# Patient Record
Sex: MALE | Race: WHITE | URBAN - METROPOLITAN AREA
[De-identification: names, ages, dates, MRNs, and addresses within clinical notes are randomized per-mention and may not be internally consistent; named-entity substitution may affect disease eponyms.]

---

## 2017-01-05 ENCOUNTER — CARE COORDINATION (OUTPATIENT)
Dept: UROLOGY | Facility: CLINIC | Age: 67
End: 2017-01-05

## 2017-01-05 NOTE — PROGRESS NOTES
Left message for patient to return my call. I want to make sure that he has started his antibiotic for next weeks procedure.

## 2017-01-06 ENCOUNTER — CARE COORDINATION (OUTPATIENT)
Dept: UROLOGY | Facility: CLINIC | Age: 67
End: 2017-01-06

## 2017-01-06 NOTE — PROGRESS NOTES
Left message for patient to return my call. I just want to make sure that he is taking his presurgery antibiotic.Annabelle Dong RN

## 2017-01-12 ENCOUNTER — ANESTHESIA (OUTPATIENT)
Dept: SURGERY | Facility: CLINIC | Age: 67
End: 2017-01-12
Payer: MEDICARE

## 2017-01-12 ENCOUNTER — SURGERY (OUTPATIENT)
Age: 67
End: 2017-01-12

## 2017-01-12 PROBLEM — N40.0 BPH (BENIGN PROSTATIC HYPERPLASIA): Status: ACTIVE | Noted: 2017-01-12

## 2017-01-12 PROCEDURE — 25800025 ZZH RX 258: Performed by: NURSE ANESTHETIST, CERTIFIED REGISTERED

## 2017-01-12 PROCEDURE — 25000128 H RX IP 250 OP 636: Performed by: UROLOGY

## 2017-01-12 PROCEDURE — 25000125 ZZHC RX 250: Performed by: NURSE ANESTHETIST, CERTIFIED REGISTERED

## 2017-01-12 PROCEDURE — 25000125 ZZHC RX 250: Performed by: UROLOGY

## 2017-01-12 RX ORDER — PROPOFOL 10 MG/ML
INJECTION, EMULSION INTRAVENOUS PRN
Status: DISCONTINUED | OUTPATIENT
Start: 2017-01-12 | End: 2017-01-12

## 2017-01-12 RX ORDER — FUROSEMIDE 10 MG/ML
INJECTION INTRAMUSCULAR; INTRAVENOUS PRN
Status: DISCONTINUED | OUTPATIENT
Start: 2017-01-12 | End: 2017-01-12

## 2017-01-12 RX ORDER — ONDANSETRON 2 MG/ML
INJECTION INTRAMUSCULAR; INTRAVENOUS PRN
Status: DISCONTINUED | OUTPATIENT
Start: 2017-01-12 | End: 2017-01-12

## 2017-01-12 RX ORDER — GLYCOPYRROLATE 0.2 MG/ML
INJECTION, SOLUTION INTRAMUSCULAR; INTRAVENOUS PRN
Status: DISCONTINUED | OUTPATIENT
Start: 2017-01-12 | End: 2017-01-12

## 2017-01-12 RX ORDER — NEOSTIGMINE METHYLSULFATE 1 MG/ML
VIAL (ML) INJECTION PRN
Status: DISCONTINUED | OUTPATIENT
Start: 2017-01-12 | End: 2017-01-12

## 2017-01-12 RX ORDER — FENTANYL CITRATE 50 UG/ML
INJECTION, SOLUTION INTRAMUSCULAR; INTRAVENOUS PRN
Status: DISCONTINUED | OUTPATIENT
Start: 2017-01-12 | End: 2017-01-12

## 2017-01-12 RX ORDER — EPHEDRINE SULFATE 50 MG/ML
INJECTION, SOLUTION INTRAMUSCULAR; INTRAVENOUS; SUBCUTANEOUS PRN
Status: DISCONTINUED | OUTPATIENT
Start: 2017-01-12 | End: 2017-01-12

## 2017-01-12 RX ORDER — SODIUM CHLORIDE, SODIUM LACTATE, POTASSIUM CHLORIDE, CALCIUM CHLORIDE 600; 310; 30; 20 MG/100ML; MG/100ML; MG/100ML; MG/100ML
INJECTION, SOLUTION INTRAVENOUS CONTINUOUS PRN
Status: DISCONTINUED | OUTPATIENT
Start: 2017-01-12 | End: 2017-01-12

## 2017-01-12 RX ORDER — LIDOCAINE HYDROCHLORIDE 20 MG/ML
INJECTION, SOLUTION INFILTRATION; PERINEURAL PRN
Status: DISCONTINUED | OUTPATIENT
Start: 2017-01-12 | End: 2017-01-12

## 2017-01-12 RX ADMIN — Medication 5 MG: at 15:49

## 2017-01-12 RX ADMIN — GLYCOPYRROLATE 0.2 MG: 0.2 INJECTION, SOLUTION INTRAMUSCULAR; INTRAVENOUS at 14:57

## 2017-01-12 RX ADMIN — GENTAMICIN SULFATE 240 MG: 40 INJECTION, SOLUTION INTRAMUSCULAR; INTRAVENOUS at 14:54

## 2017-01-12 RX ADMIN — Medication 5 MG: at 14:54

## 2017-01-12 RX ADMIN — ONDANSETRON 4 MG: 2 INJECTION INTRAMUSCULAR; INTRAVENOUS at 15:10

## 2017-01-12 RX ADMIN — PROPOFOL 150 MG: 10 INJECTION, EMULSION INTRAVENOUS at 14:49

## 2017-01-12 RX ADMIN — MIDAZOLAM HYDROCHLORIDE 2 MG: 1 INJECTION, SOLUTION INTRAMUSCULAR; INTRAVENOUS at 14:40

## 2017-01-12 RX ADMIN — FENTANYL CITRATE 100 MCG: 50 INJECTION, SOLUTION INTRAMUSCULAR; INTRAVENOUS at 14:49

## 2017-01-12 RX ADMIN — FUROSEMIDE 10 MG: 10 INJECTION, SOLUTION INTRAVENOUS at 15:58

## 2017-01-12 RX ADMIN — FUROSEMIDE 10 MG: 10 INJECTION, SOLUTION INTRAVENOUS at 15:59

## 2017-01-12 RX ADMIN — Medication 5 MG: at 14:57

## 2017-01-12 RX ADMIN — SODIUM CHLORIDE, POTASSIUM CHLORIDE, SODIUM LACTATE AND CALCIUM CHLORIDE: 600; 310; 30; 20 INJECTION, SOLUTION INTRAVENOUS at 14:40

## 2017-01-12 RX ADMIN — LIDOCAINE HYDROCHLORIDE 100 MG: 20 INJECTION, SOLUTION INFILTRATION; PERINEURAL at 14:49

## 2017-01-12 RX ADMIN — CEFAZOLIN SODIUM 2 G: 2 INJECTION, SOLUTION INTRAVENOUS at 14:55

## 2017-01-12 RX ADMIN — ROCURONIUM BROMIDE 30 MG: 10 INJECTION INTRAVENOUS at 14:49

## 2017-01-12 RX ADMIN — NEOSTIGMINE METHYLSULFATE 3 MG: 1 INJECTION INTRAMUSCULAR; INTRAVENOUS; SUBCUTANEOUS at 16:16

## 2017-01-12 RX ADMIN — GLYCOPYRROLATE 0.6 MG: 0.2 INJECTION, SOLUTION INTRAMUSCULAR; INTRAVENOUS at 16:16

## 2017-01-12 RX ADMIN — GLYCOPYRROLATE 0.2 MG: 0.2 INJECTION, SOLUTION INTRAMUSCULAR; INTRAVENOUS at 15:00

## 2017-02-09 ENCOUNTER — PRE VISIT (OUTPATIENT)
Dept: UROLOGY | Facility: CLINIC | Age: 67
End: 2017-02-09

## 2017-02-09 NOTE — TELEPHONE ENCOUNTER
Holmium laser enucleation of the prostate surgical follow up. Left message with patient to arrive to visit with a full bladder for flow/pvr or urine sample. Instructed patient to call clinic with questions

## 2017-02-14 ENCOUNTER — OFFICE VISIT (OUTPATIENT)
Dept: UROLOGY | Facility: CLINIC | Age: 67
End: 2017-02-14

## 2017-02-14 VITALS
HEIGHT: 64 IN | SYSTOLIC BLOOD PRESSURE: 113 MMHG | HEART RATE: 57 BPM | BODY MASS INDEX: 24.01 KG/M2 | WEIGHT: 140.6 LBS | DIASTOLIC BLOOD PRESSURE: 69 MMHG

## 2017-02-14 DIAGNOSIS — R97.20 ELEVATED PROSTATE SPECIFIC ANTIGEN (PSA): ICD-10-CM

## 2017-02-14 DIAGNOSIS — R97.20 ELEVATED PROSTATE SPECIFIC ANTIGEN (PSA): Primary | ICD-10-CM

## 2017-02-14 LAB — PSA SERPL-MCNC: 1.43 UG/L (ref 0–4)

## 2017-02-14 RX ORDER — TADALAFIL 5 MG
TABLET ORAL
Refills: 12 | COMMUNITY
Start: 2016-12-09

## 2017-02-14 ASSESSMENT — PAIN SCALES - GENERAL: PAINLEVEL: NO PAIN (0)

## 2017-02-14 NOTE — MR AVS SNAPSHOT
After Visit Summary   2/14/2017    Doni Niño    MRN: [id number]           Patient Information     Date Of Birth          1950        Visit Information        Provider Department      2/14/2017 10:00 AM Darnell Vidales MD [facility] Urology and [facility] for Prostate and Urologic Cancers        Today's Diagnoses     Elevated prostate specific antigen (PSA)    -  1      Care Instructions    PSA today.   Return in 2 months with a full bladder for a flow/pvr.    It was a pleasure meeting with you today.  Thank you for allowing me and my team the privilege of caring for you today.  YOU are the reason we are here, and I truly hope we provided you with the excellent service you deserve.  Please let us know if there is anything else we can do for you so that we can be sure you are leaving completely satisfied with your care experience.                Follow-ups after your visit        Your next 10 appointments already scheduled     May 09, 2017 10:00 AM CDT   (Arrive by 9:45 AM)   Return Visit with MD JOHNNY Puente [facility] Urology and [facility] for Prostate and Urologic Cancers ([facility])    [address] [place] [id number] [phone]              Who to contact     Please call your clinic at [phone] to:    Ask questions about your health    Make or cancel appointments    Discuss your medicines    Learn about your test results    Speak to your doctor   If you have compliments or concerns about an experience at your clinic, or if you wish to file a complaint, please contact [facility] Patient Relations at [phone] or email us at [email or web address]         Additional Information About Your Visit        MyChart Information     Vidit is an electronic gateway that provides easy, online access to your medical records. With Vidit, you can request a clinic appointment, read your test results, renew  "a prescription or communicate with your care team.     To sign up for MyChart visit the website at [email or web address]   You will be asked to enter the access code listed below, as well as some personal information. Please follow the directions to create your username and password.     Your access code is: [email or web address]  Expires: 3/20/2017 10:02 AM     Your access code will  in 90 days. If you need help or a new code, please contact your [facility] or call [phone] for assistance.        Care EveryWhere ID     This is your Care EveryWhere ID. This could be used by other organizations to access your [facility] medical records  [id number]        Your Vitals Were     Pulse Height BMI (Body Mass Index)             57 1.626 m (5' 4\") 24.13 kg/m2          Blood Pressure from Last 3 Encounters:   17 113/69   17 96/60   16 109/58    Weight from Last 3 Encounters:   17 63.8 kg (140 lb 9.6 oz)   17 62.5 kg (137 lb 12.6 oz)   16 64.5 kg (142 lb 1.6 oz)              We Performed the Following     COMPLEX UROFLOWMETRY     POST-VOID RESIDUAL BLADDER SCAN        Primary Care Provider Office Phone # Fax #    Darnell Persaud [phone]       [facility] [address] [address]        Thank you!     Thank you for choosing [facility] UROLOGY AND [facility] FOR PROSTATE AND UROLOGIC CANCERS  for your care. Our goal is always to provide you with excellent care. Hearing back from our patients is one way we can continue to improve our services. Please take a few minutes to complete the written survey that you may receive in the mail after your visit with us. Thank you!             Your Updated Medication List - Protect others around you: Learn how to safely use, store and throw away your medicines at www.disposemymeds.org.          This list is accurate as of: 17 11:59 PM.  Always use your most recent med list.                   " Brand Name Dispense Instructions for use    CIALIS 5 MG tablet   Generic drug:  tadalafil      Reported on 2/14/2017       ibuprofen 200 MG tablet    ADVIL/MOTRIN     Take 200 mg by mouth every 6 hours as needed for mild pain Reported on 2/14/2017       [facility]      Respironics REMSTAR 60 Series Auto CPAP 8-10cm H2O, mirage Fx nasal cushion, std       senna-docusate 8.6-50 MG per tablet    SENOKOT-S;PERICOLACE    30 tablet    Take 1-2 tablets by mouth 2 times daily as needed for constipation Take while on oral narcotics to prevent or treat constipation.       triamcinolone 0.1 % cream    KENALOG     Apply topically as needed Reported on 2/14/2017       VITAMIN D PO      Take 1 tablet by mouth daily Take one tablet daily

## 2017-02-14 NOTE — LETTER
2/14/2017       RE: Doni Niño  [address] [address] [address]     Dear Colleague,    Thank you for referring your patient, Doni Niño, to the [facility] UROLOGY AND INST FOR PROSTATE AND UROLOGIC CANCERS at [facility]. Please see a copy of my visit note below.    HoLEP 4 Week Follow-Up Note    Today I had the pleasure of seeing Mr. Niño in follow-up for a history of large gland BPH    He underwent holmium laser enucleation of the prostate approximately 4 weeks ago     48 gms of benign tissue were removed.  Pathology was benign    His recovery thus far has been uncomplicated.  He is a very active person and would like to resume biking, running, and weight lifting.    Today he notes that his stream is strong.  He does have some urinary leakage but it is steadily improving.  He is no longer using depends and is now down to underwear liners.  He is using 3 per day.       02/14/17  1043    Urinary Flow Rate   Peak Flow 26.4 mL/s     Average Flow 12 mL/s  by Pauline Bledsoe CMA  at 02/14/17 1043    Voided (mL) 128 mL  by Pauline Bledsoe CMA  at 02/14/17 1043    Residual Volume by Ultrasound 65 mL  by Pauline Bledsoe CMA  at 02/14/17 1043    Character of Curve Bell Shape     PSA   Date Value Ref Range Status   02/14/2017 1.43 0 - 4 ug/L Final     Comment:     Assay Method:  Chemiluminescence using Siemens Vista analyzer     Assessment/Plan - 66  year old male 4 weeks status post HoLEP  -Readressed post-op expectations after HoLEP.  Patient is having expected recovery, anticipate urinary leakage to continue to improve  -Will iniitate Roxanne, provided handout  -PSA is normal, down from 7-9 range preop  -May resume activities  -Return for symptom check in 2 months    Greater than 15 minutes spent with patient over half spent counseling and reviewing pathology, Kegels exercises, and post-op expectations for recovery.        Again, thank you for allowing me to  participate in the care of your patient.      Sincerely,    Darnell Vidales MD

## 2017-02-14 NOTE — PATIENT INSTRUCTIONS
PSA today.   Return in 2 months with a full bladder for a flow/pvr.    It was a pleasure meeting with you today.  Thank you for allowing me and my team the privilege of caring for you today.  YOU are the reason we are here, and I truly hope we provided you with the excellent service you deserve.  Please let us know if there is anything else we can do for you so that we can be sure you are leaving completely satisfied with your care experience.

## 2017-02-15 NOTE — PROGRESS NOTES
HoLEP 4 Week Follow-Up Note    Today I had the pleasure of seeing Mr. Niño in follow-up for a history of large gland BPH    He underwent holmium laser enucleation of the prostate approximately 4 weeks ago     48 gms of benign tissue were removed.  Pathology was benign    His recovery thus far has been uncomplicated.  He is a very active person and would like to resume biking, running, and weight lifting.    Today he notes that his stream is strong.  He does have some urinary leakage but it is steadily improving.  He is no longer using depends and is now down to underwear liners.  He is using 3 per day.       02/14/17  1043    Urinary Flow Rate   Peak Flow 26.4 mL/s     Average Flow 12 mL/s  by Pauline Bledsoe CMA  at 02/14/17 1043    Voided (mL) 128 mL  by Pauline Bledsoe CMA  at 02/14/17 1043    Residual Volume by Ultrasound 65 mL  by Pauline Bledsoe CMA  at 02/14/17 1043    Character of Curve Bell Shape     PSA   Date Value Ref Range Status   02/14/2017 1.43 0 - 4 ug/L Final     Comment:     Assay Method:  Chemiluminescence using Siemens Vista analyzer     Assessment/Plan - 66  year old male 4 weeks status post HoLEP  -Readressed post-op expectations after HoLEP.  Patient is having expected recovery, anticipate urinary leakage to continue to improve  -Will iniitaeliane Oliveira, lizette handout  -PSA is normal, down from 7-9 range preop  -May resume activities  -Return for symptom check in 2 months    Greater than 15 minutes spent with patient over half spent counseling and reviewing pathology, Kegels exercises, and post-op expectations for recovery.

## 2017-05-02 ENCOUNTER — PRE VISIT (OUTPATIENT)
Dept: UROLOGY | Facility: CLINIC | Age: 67
End: 2017-05-02

## 2017-05-02 NOTE — TELEPHONE ENCOUNTER
Patient coming in for surgical follow up. Patient will need flow/ru. Message left for patient to arrive with full bladder and to call clinic with any questions.

## 2017-05-09 ENCOUNTER — OFFICE VISIT (OUTPATIENT)
Dept: UROLOGY | Facility: CLINIC | Age: 67
End: 2017-05-09

## 2017-05-09 VITALS
DIASTOLIC BLOOD PRESSURE: 64 MMHG | HEART RATE: 50 BPM | BODY MASS INDEX: 23.9 KG/M2 | WEIGHT: 140 LBS | SYSTOLIC BLOOD PRESSURE: 113 MMHG | HEIGHT: 64 IN

## 2017-05-09 DIAGNOSIS — R35.0 URINARY FREQUENCY: Primary | ICD-10-CM

## 2017-05-09 ASSESSMENT — PAIN SCALES - GENERAL: PAINLEVEL: NO PAIN (0)

## 2017-05-09 NOTE — NURSING NOTE
Chief Complaint   Patient presents with     RECHECK     PVR     PVR was obtained via ultrasound    Christopher FISHER

## 2017-05-09 NOTE — LETTER
2017     RE: Doni Niño  [address] [address] [address]     Dear Colleague,    Thank you for referring your patient, Doni Niño, to the [facility] UROLOGY AND INST FOR PROSTATE AND UROLOGIC CANCERS at [facility]. Please see a copy of my visit note below.    HoLEP 12 Week Follow-Up Note    Today I had the pleasure of seeing Mr. Niño in follow-up for a history of BPH     He underwent holmium laser enucleation of the prostate approximately 12 weeks ago     48 gms of tissue were removed.  Pathology was benign    His recovery thus far has been uncomplicated.    Today he notes an excellent stream.  He empties rapidly and to completion.  He does still have a slight amount of urinary leakage for which he uses 1 underwear shield per day.  This is a significant improvement from last visit.  He has resumed all normal activities and denies hematuria, dysuria or UTI.    AUA Symptom Score is 16/35 with a 3/6 for bother (23/2 preop)    Uroflow/Post-Void Residual by Bladder Scan  Voided Volume - 260  qMax - 39.5 mL/sec  qAvg - 19.7  PVR - 13  Shape of curve - bell    Preop UF/BS  Voided Volume: 199  QMax: 5.2  QAv.9  PVR: 62  Curve - flat from start to finish    PSA   Date Value Ref Range Status   2017 1.43 0 - 4 ug/L Final     Comment:     Assay Method:  Chemiluminescence using Siemens Vista analyzer       Assessment/Plan - 66 year old male 12 weeks status post HoLEP  -Overall doing well aside from slight urinary leakage which is improving   -Encouraged Simran, will also send for evaluation with pelvic floor PT to make sure doing exercises correctly  -RTC 1 year with PSA or sooner PRN      >15 minutes were spent face to face with patient over half of which was spent providing medical counseling regarding post-HoLEP urinary symptoms, pelvic floor strengthening    Again, thank you for allowing me to participate in the care of your patient.      Sincerely,    Darnell CURTIS  MD Flash

## 2017-05-09 NOTE — PATIENT INSTRUCTIONS
Follow up in 1 year with Dr Vidales. Please arrive to visit with a full bladder. You'll be notify to schedule closer to visit    It was a pleasure meeting with you today.  Thank you for allowing me and my team the privilege of caring for you today.  YOU are the reason we are here, and I truly hope we provided you with the excellent service you deserve.  Please let us know if there is anything else we can do for you so that we can be sure you are leaving completely satisfied with your care experience.

## 2017-05-09 NOTE — MR AVS SNAPSHOT
After Visit Summary   5/9/2017    Doni Niño    MRN: [id number]           Patient Information     Date Of Birth          1950        Visit Information        Provider Department      5/9/2017 10:00 AM Darnell Vidales MD [facility]rology and [facility] for Prostate and Urologic Cancers        Today's Diagnoses     Urinary frequency    -  1      Care Instructions    Follow up in 1 year with Dr Vidales. Please arrive to visit with a full bladder. You'll be notify to schedule closer to visit    It was a pleasure meeting with you today.  Thank you for allowing me and my team the privilege of caring for you today.  YOU are the reason we are here, and I truly hope we provided you with the excellent service you deserve.  Please let us know if there is anything else we can do for you so that we can be sure you are leaving completely satisfied with your care experience.                Follow-ups after your visit        Additional Services     [facility] Physical Therapy Referral       **This order will print in the [facility] Scheduling Office**    Physical Therapy, Hand Therapy and Chiropractic Care are available through:    *[facility]  *[facility]  *[facility]    Call one number to schedule at any of the above locations: [phone].    Your provider has referred you to: Physical Therapy at [facility] or [facility]    Indication/Reason for Referral: Men's Health (Please Complete Special Programs SmartList)  Onset of Illness: 2/2017  Therapy Orders: Evaluate and Treat  Special Programs: Men's Health: Pelvic Floor Weakness/Incontinence - Specific Diagnosis: Urinary leakage  Special Request: Exercise: Conditioning, Home Exercise Program and Simran Hay      Additional Comments for the Therapist or Chiropractor:     Please be aware that coverage of these services is subject to the terms and limitations of your health insurance plan.  Call member services at  "your health plan with any benefit or coverage questions.      Please bring the following to your appointment:    *Your personal calendar for scheduling future appointments  *Comfortable clothing                  Who to contact     Please call your clinic at [phone] to:    Ask questions about your health    Make or cancel appointments    Discuss your medicines    Learn about your test results    Speak to your doctor   If you have compliments or concerns about an experience at your clinic, or if you wish to file a complaint, please contact [facility] Patient Relations at [phone] or email us at [email or web address]         Additional Information About Your Visit        [employer] Information     [employer] is an electronic gateway that provides easy, online access to your medical records. With [employer], you can request a clinic appointment, read your test results, renew a prescription or communicate with your care team.     To sign up for [employer] visit the website at [email or web address]   You will be asked to enter the access code listed below, as well as some personal information. Please follow the directions to create your username and password.     Your access code is: [email or web address]  Expires: 2017  6:30 AM     Your access code will  in 90 days. If you need help or a new code, please contact your [facility] or call [phone] for assistance.        Care EveryWhere ID     This is your Care EveryWhere ID. This could be used by other organizations to access your [facility] medical records  [id number]        Your Vitals Were     Pulse Height BMI (Body Mass Index)             50 1.626 m (5' 4\") 24.03 kg/m2          Blood Pressure from Last 3 Encounters:   17 113/64   17 113/69   17 96/60    Weight from Last 3 Encounters:   17 63.5 kg (140 lb)   17 63.8 kg (140 lb 9.6 oz)   17 62.5 kg (137 lb 12.6 " oz)              We Performed the Following     [facility] Physical Therapy Referral     POST-VOID RESIDUAL BLADDER SCAN        Primary Care Provider Office Phone # Fax #    Darnell Persaud [phone]       [facility] [address] [place] [address]        Thank you!     Thank you for choosing [facility] UROLOGY AND [facility] FOR PROSTATE AND UROLOGIC CANCERS  for your care. Our goal is always to provide you with excellent care. Hearing back from our patients is one way we can continue to improve our services. Please take a few minutes to complete the written survey that you may receive in the mail after your visit with us. Thank you!             Your Updated Medication List - Protect others around you: Learn how to safely use, store and throw away your medicines at www.disposemymeds.org.          This list is accurate as of: 5/9/17 11:59 PM.  Always use your most recent med list.                   Brand Name Dispense Instructions for use    CIALIS 5 MG tablet   Generic drug:  tadalafil      Reported on 2/14/2017       ibuprofen 200 MG tablet    ADVIL/MOTRIN     Take 200 mg by mouth every 6 hours as needed for mild pain Reported on 2/14/2017       order for [facility]      Respironics REMSTAR 60 Series Auto CPAP 8-10cm H2O, mirage Fx nasal cushion, std       senna-docusate 8.6-50 MG per tablet    SENOKOT-S;PERICOLACE    30 tablet    Take 1-2 tablets by mouth 2 times daily as needed for constipation Take while on oral narcotics to prevent or treat constipation.       triamcinolone 0.1 % cream    KENALOG     Apply topically as needed Reported on 2/14/2017       VITAMIN D PO      Take 1 tablet by mouth daily Take one tablet daily

## 2017-05-09 NOTE — PROGRESS NOTES
HoLEP 12 Week Follow-Up Note    Today I had the pleasure of seeing Mr. Niño in follow-up for a history of BPH     He underwent holmium laser enucleation of the prostate approximately 12 weeks ago     48 gms of tissue were removed.  Pathology was benign    His recovery thus far has been uncomplicated.    Today he notes an excellent stream.  He empties rapidly and to completion.  He does still have a slight amount of urinary leakage for which he uses 1 underwear shield per day.  This is a significant improvement from last visit.  He has resumed all normal activities and denies hematuria, dysuria or UTI.    AUA Symptom Score is 16/35 with a 3/6 for bother (23/2 preop)    Uroflow/Post-Void Residual by Bladder Scan  Voided Volume - 260  qMax - 39.5 mL/sec  qAvg - 19.7  PVR - 13  Shape of curve - bell    Preop UF/BS  Voided Volume: 199  QMax: 5.2  QAv.9  PVR: 62  Curve - flat from start to finish    PSA   Date Value Ref Range Status   2017 1.43 0 - 4 ug/L Final     Comment:     Assay Method:  Chemiluminescence using Siemens Vista analyzer       Assessment/Plan - 66 year old male 12 weeks status post HoLEP  -Overall doing well aside from slight urinary leakage which is improving   -Encouraged Simran, will also send for evaluation with pelvic floor PT to make sure doing exercises correctly  -RTC 1 year with PSA or sooner PRN      >15 minutes were spent face to face with patient over half of which was spent providing medical counseling regarding post-HoLEP urinary symptoms, pelvic floor strengthening

## 2017-05-18 ENCOUNTER — THERAPY VISIT (OUTPATIENT)
Dept: PHYSICAL THERAPY | Facility: CLINIC | Age: 67
End: 2017-05-18
Payer: COMMERCIAL

## 2017-05-18 DIAGNOSIS — M99.05 PELVIC SOMATIC DYSFUNCTION: Primary | ICD-10-CM

## 2017-05-18 DIAGNOSIS — N39.46 MIXED INCONTINENCE URGE AND STRESS (MALE)(FEMALE): ICD-10-CM

## 2017-05-18 DIAGNOSIS — R27.9 LACK OF COORDINATION: ICD-10-CM

## 2017-05-18 PROCEDURE — G8979 MOBILITY GOAL STATUS: HCPCS | Mod: GP | Performed by: PHYSICAL THERAPIST

## 2017-05-18 PROCEDURE — 97110 THERAPEUTIC EXERCISES: CPT | Mod: GP | Performed by: PHYSICAL THERAPIST

## 2017-05-18 PROCEDURE — G8978 MOBILITY CURRENT STATUS: HCPCS | Mod: GP | Performed by: PHYSICAL THERAPIST

## 2017-05-18 PROCEDURE — 97530 THERAPEUTIC ACTIVITIES: CPT | Mod: GP | Performed by: PHYSICAL THERAPIST

## 2017-05-18 PROCEDURE — 97161 PT EVAL LOW COMPLEX 20 MIN: CPT | Mod: GP | Performed by: PHYSICAL THERAPIST

## 2017-05-18 NOTE — Clinical Note
Please see the evaluation report completed in PT today.  Thank you for referring Ajit to us! Cordially,  ROBERT Jameson, MPT

## 2017-05-18 NOTE — MR AVS SNAPSHOT
After Visit Summary   5/18/2017    Doni Niño    MRN: [id number]           Patient Information     Date Of Birth          1950        Visit Information        Provider Department      5/18/2017 4:00 PM Anne Jameson PT [facility]        Today's Diagnoses     Pelvic somatic dysfunction    -  1    Mixed incontinence urge and stress (male)(female)        Lack of coordination           Follow-ups after your visit        Your next 10 appointments already scheduled     May 25, 2017  5:20 PM CDT   [facility] with Anne Jameson [facility]   [facility] ([facility]  )    [address] [facility] [address]   [phone]            Jun 08, 2017  4:40 PM CDT   [facility] with Anne Jameson PT   [facility] ([facility]  )    [address] [facility] [id number] [phone]              Who to contact     If you have questions or need follow up information about today's clinic visit or your schedule please contact [facility] [employer] [facility] directly at [phone].  Normal or non-critical lab and imaging results will be communicated to you by MyChart, letter or phone within 4 business days after the clinic has received the results. If you do not hear from us within 7 days, please contact the clinic through [employer] or phone. If you have a critical or abnormal lab result, we will notify you by phone as soon as possible.  Submit refill requests through [employer] or call your pharmacy and they will forward the refill request to us. Please allow 3 business days for your refill to be completed.          Additional Information About Your Visit        [employer] Information     WellDoc lets you send messages to your doctor, view your test results, renew your prescriptions, schedule appointments and more. To sign up, go to  [email or web address] . Click on \"Log in\" on the left side of the screen, which will take you to the Welcome page. Then click on \"Sign up Now\" on the right side of the page.     You will be asked to enter the access code listed below, as well as some personal information. Please follow the directions to create your username and password.     Your access code is: [email or web address]  Expires: 2017  6:30 AM     Your access code will  in 90 days. If you need help or a new code, please call your [place] clinic or [phone].        Care EveryWhere ID     This is your Care EveryWhere ID. This could be used by other organizations to access your [place] medical records  [id number]         Blood Pressure from Last 3 Encounters:   17 113/64   17 113/69   17 96/60    Weight from Last 3 Encounters:   17 63.5 kg (140 lb)   17 63.8 kg (140 lb 9.6 oz)   17 62.5 kg (137 lb 12.6 oz)              We Performed the Following     HC PT EVAL, LOW COMPLEXITY     ANA CERT REPORT     THERAPEUTIC ACTIVITIES     THERAPEUTIC EXERCISES        Primary Care Provider Office Phone # Fax #    Darnell SHAHID Hung [phone]       [facility] [address] [facility] [address]        Thank you!     Thank you for choosing [facility]  for your care. Our goal is always to provide you with excellent care. Hearing back from our patients is one way we can continue to improve our services. Please take a few minutes to complete the written survey that you may receive in the mail after your visit with us. Thank you!             Your Updated Medication List - Protect others around you: Learn how to safely use, store and throw away your medicines at www.disposemymeds.org.          This list is accurate as of: 17  5:53 PM.  Always use your most recent med list.                   Brand Name Dispense Instructions for use    CIALIS 5 MG tablet   Generic drug:  " tadalafil      Reported on 2/14/2017       ibuprofen 200 MG tablet    ADVIL/MOTRIN     Take 200 mg by mouth every 6 hours as needed for mild pain Reported on 2/14/2017       [place] [facility]      Respironics REMSTAR 60 Series Auto CPAP 8-10cm H2O, mirage Fx nasal cushion, std       senna-docusate 8.6-50 MG per tablet    SENOKOT-S;PERICOLACE    30 tablet    Take 1-2 tablets by mouth 2 times daily as needed for constipation Take while on oral narcotics to prevent or treat constipation.       triamcinolone 0.1 % cream    KENALOG     Apply topically as needed Reported on 2/14/2017       VITAMIN D PO      Take 1 tablet by mouth daily Take one tablet daily

## 2017-05-18 NOTE — PROGRESS NOTES
[facility] Initial Evaluation      Subjective:    Patient is a 66 year old male presenting with rehab pelvic hpi. The history is provided by the patient.   Doni Niño is a 66 year old male with a pelvic dysfunction condition.  Condition occurred with:  After surgery.  Condition occurred: for unknown reasons.  This is a new condition  Pt had a new prostate surgery done w/laser (TURP?) in Jan 2017 to have 70% of the gland removed. He has since had some urinary incontinence and urinary frequency.  He has only needed to use one pad per day, and some days no pad at all.  He is voiding about every 30-60 minutes during the day, but is able to go all night w/o getting up to void.    He is only drinking about 16 oz of water per day, but he also drinks OJ, eats lots of fruits (heavy in fluids), iced tea, coffee.  He has BMs about every other day, eats prunes to help with his constipation. He has used stool softeners in the past.   He had his last urology recheck on 5/9/17 and was referred to PT at that time..    Patient reports pain:  N/a.      Pain Scale: N/A.   Pain is the same all the time and worse during the day.  Exacerbated by: urinary leaks w/odd movements, getting up too fast, lifting (usually small drops) and relieved by activity/movement (Kegels).  Since onset symptoms are gradually improving.        General health as reported by patient is excellent.  Pertinent medical history includes:  Osteoarthritis, incontinence and sleep disorder/apnea.  Medical allergies: none.  Other surgeries include:  Other (appendix, tonsils).  Current medications:  None as reported by the patient.  Current occupation is Retired .    Primary job tasks include:  Driving, lifting and repetitive tasks (pushing/pulling, lawn mowing, yard work).    Barriers include:  None as reported by the patient.    Red flags:  None as reported by the patient.                        Objective:    System                                  Pelvic Dysfunction Evaluation:    Bladder/Pelvic Problems:    Storage Problem:  Frequency and stress incontinence  Emptying Problem:  Postvoid dribbling            Pelvic Clock Exam:    Ischiocavernosis pain:  -  Bulbocavernosis pain:  -  Transverse Perineal:  -  Levator ANI:  -  Perineal Body:  -    Reflex Testing:  normal    External Assessment:  External assessment pelvic: extremely large scrotum.  Skin Condition:  Normal    Bearing Down/Coughing:  Normal  Tissue Symmetry:  Normal    Muscle Contraction/Perineal Mobility:  Substitution and elevation and urogential triangle descent  Internal Assessment:  NA              SEMG Biofeedback:    Equipment:  Blink Messenger-10 unit, Synergy program    Suraface electrode placement--Perianal:  Bilaterally placed  Baseline EMG PM:  3.5 uV    Peak pelvic muscle contraction:  18 uV w/both phasic and tonic contractions  Sustained contraction:  Poorly held, tended to hold breath, lots of substitutions  EMG interpretation to fatigue:  3-5 seconds  Position:  SupineAdditional History:        Caffeine Consumption:  12-18 oz per day                     General     ROS    Assessment/Plan:      Patient is a 66 year old male with pelvic complaints.    Patient has the following significant findings with corresponding treatment plan.                Diagnosis 1:  PF dysfunction s/p prostate procedure  Decreased strength - therapeutic exercise and therapeutic activities  Impaired muscle performance - biofeedback and neuro re-education  Decreased function - therapeutic activities    Therapy Evaluation Codes:   1) History comprised of:   Personal factors that impact the plan of care:      None.    Comorbidity factors that impact the plan of care are:      None.     Medications impacting care: None.  2) Examination of Body Systems comprised of:   Body structures and functions that impact the plan of care:      Pelvis.   Activity limitations that impact the plan of care are:      Lifting,  Frequency and Stress incontinence.  3) Clinical presentation characteristics are:   Stable/Uncomplicated.  4) Decision-Making    Low complexity using standardized patient assessment instrument and/or measureable assessment of functional outcome.  Cumulative Therapy Evaluation is: Low complexity.    Previous and current functional limitations:  (See Goal Flow Sheet for this information)    Short term and Long term goals: (See Goal Flow Sheet for this information)     Communication ability:  Patient appears to be able to clearly communicate and understand verbal and written communication and follow directions correctly.  Treatment Explanation - The following has been discussed with the patient:   RX ordered/plan of care  Anticipated outcomes  Possible risks and side effects  This patient would benefit from PT intervention to resume normal activities.   Rehab potential is excellent.    Frequency:  1 X week, once daily  Duration:  for 2 weeks tapering to 1 X a month over 1 month  Discharge Plan:  Achieve all LTG.  Independent in home treatment program.  Reach maximal therapeutic benefit.    Please refer to the daily flowsheet for treatment today, total treatment time and time spent performing 1:1 timed codes.

## 2017-05-25 ENCOUNTER — THERAPY VISIT (OUTPATIENT)
Dept: PHYSICAL THERAPY | Facility: CLINIC | Age: 67
End: 2017-05-25
Payer: COMMERCIAL

## 2017-05-25 DIAGNOSIS — R27.9 LACK OF COORDINATION: ICD-10-CM

## 2017-05-25 DIAGNOSIS — N39.46 MIXED INCONTINENCE URGE AND STRESS (MALE)(FEMALE): ICD-10-CM

## 2017-05-25 DIAGNOSIS — M99.05 PELVIC SOMATIC DYSFUNCTION: ICD-10-CM

## 2017-05-25 PROCEDURE — 97110 THERAPEUTIC EXERCISES: CPT | Mod: GP | Performed by: PHYSICAL THERAPIST

## 2017-05-25 PROCEDURE — 97112 NEUROMUSCULAR REEDUCATION: CPT | Mod: GP | Performed by: PHYSICAL THERAPIST

## 2017-06-08 ENCOUNTER — THERAPY VISIT (OUTPATIENT)
Dept: PHYSICAL THERAPY | Facility: CLINIC | Age: 67
End: 2017-06-08
Payer: COMMERCIAL

## 2017-06-08 DIAGNOSIS — R27.9 LACK OF COORDINATION: ICD-10-CM

## 2017-06-08 DIAGNOSIS — M99.05 PELVIC SOMATIC DYSFUNCTION: ICD-10-CM

## 2017-06-08 DIAGNOSIS — N39.46 MIXED INCONTINENCE URGE AND STRESS (MALE)(FEMALE): ICD-10-CM

## 2017-06-08 PROCEDURE — G8980 MOBILITY D/C STATUS: HCPCS | Mod: GP | Performed by: PHYSICAL THERAPIST

## 2017-06-08 PROCEDURE — 97110 THERAPEUTIC EXERCISES: CPT | Mod: GP | Performed by: PHYSICAL THERAPIST

## 2017-06-08 PROCEDURE — 97530 THERAPEUTIC ACTIVITIES: CPT | Mod: GP | Performed by: PHYSICAL THERAPIST

## 2017-06-08 PROCEDURE — 97112 NEUROMUSCULAR REEDUCATION: CPT | Mod: GP | Performed by: PHYSICAL THERAPIST

## 2017-06-08 PROCEDURE — G8979 MOBILITY GOAL STATUS: HCPCS | Mod: GP | Performed by: PHYSICAL THERAPIST

## 2017-06-08 NOTE — PROGRESS NOTES
Subjective:    HPI                    Objective:    System    Physical Exam    General     ROS    Assessment/Plan:      DISCHARGE REPORT    Progress reporting period is from 5/18/17 to 6/8/17.       SUBJECTIVE  Subjective changes noted by patient:   Pt notes that he mostly is able to make it through a day w/only a drop or 2 for leaks and hasn't needed to wear a pad for the last 2 wks; he is even lifting wts at the gym and no leaks. But pt had climbed a tree and no leaks w/the climbing.    Current pain level is  0/10.     Previous pain level was  0/10  .   Changes in function:  Yes (See Goal flowsheet attached for changes in current functional level)  Adverse reaction to treatment or activity: None    OBJECTIVE  Changes noted in objective findings:     PF strength is at functional levels, avg'ing 15 uV for both phasic and tonic contractions in sitting position. Pt was also able to perform a graded contraction (from rest to 50% to full, back to 50% and back to rest) w/o needing to watch the monitor.     ASSESSMENT/PLAN  Updated problem list and treatment plan: Diagnosis 1:  PF dysfunction w/urinary incontinence    STG/LTGs have been met or progress has been made towards goals:  Yes (See Goal flow sheet completed today.)  Assessment of Progress: The patient has met all of their long term goals.  Self Management Plans:  Patient has been instructed in a home treatment program.  Patient is independent in a home treatment program.  Patient  has been instructed in self management of symptoms.  Patient is independent in self management of symptoms.    Doni continues to require the following intervention to meet STG and LTG's:  PT intervention is no longer required to meet STG/LTG.    Recommendations:  This patient is ready to be discharged from therapy and continue their home treatment program.    Please refer to the daily flowsheet for treatment today, total treatment time and time spent performing 1:1 timed  codes.

## 2017-06-08 NOTE — MR AVS SNAPSHOT
"              After Visit Summary   2017    Doni Niño    MRN: [id number]           Patient Information     Date Of Birth          1950        Visit Information        Provider Department      2017 4:40 PM Anne Jameson PT [facility]        Today's Diagnoses     Pelvic somatic dysfunction        Lack of coordination        Mixed incontinence urge and stress (male)(female)           Follow-ups after your visit        Who to contact     If you have questions or need follow up information about today's clinic visit or your schedule please contact [facility] directly at [phone].  Normal or non-critical lab and imaging results will be communicated to you by Cogbook[employer]art, letter or phone within 4 business days after the clinic has received the results. If you do not hear from us within 7 days, please contact the clinic through [employer] or phone. If you have a critical or abnormal lab result, we will notify you by phone as soon as possible.  Submit refill requests through [employer] or call your pharmacy and they will forward the refill request to us. Please allow 3 business days for your refill to be completed.          Additional Information About Your Visit        MyChart Information     [employer] lets you send messages to your doctor, view your test results, renew your prescriptions, schedule appointments and more. To sign up, go to [email or web address] . Click on \"Log in\" on the left side of the screen, which will take you to the Welcome page. Then click on \"Sign up Now\" on the right side of the page.     You will be asked to enter the access code listed below, as well as some personal information. Please follow the directions to create your username and password.     Your access code is: [email or web address]  Expires: 2017  6:30 AM     Your access code will  in 90 days. If you need help or a new code, please call your [place] " clinic or [phone].        Care EveryWhere ID     This is your Care EveryWhere ID. This could be used by other organizations to access your [place] medical records  [id number]         Blood Pressure from Last 3 Encounters:   05/09/17 113/64   02/14/17 113/69   01/13/17 96/60    Weight from Last 3 Encounters:   05/09/17 63.5 kg (140 lb)   02/14/17 63.8 kg (140 lb 9.6 oz)   01/12/17 62.5 kg (137 lb 12.6 oz)              We Performed the Following     ANA PROGRESS NOTES REPORT     NEUROMUSCULAR RE-EDUCATION     THERAPEUTIC ACTIVITIES     THERAPEUTIC EXERCISES        Primary Care Provider Office Phone # Fax #    Darnell SHAHID Hung [phone]       [facility] [address]  [place] [address]        Thank you!     Thank you for choosing [facility]  for your care. Our goal is always to provide you with excellent care. Hearing back from our patients is one way we can continue to improve our services. Please take a few minutes to complete the written survey that you may receive in the mail after your visit with us. Thank you!             Your Updated Medication List - Protect others around you: Learn how to safely use, store and throw away your medicines at www.disposemymeds.org.          This list is accurate as of: 6/8/17  5:40 PM.  Always use your most recent med list.                   Brand Name Dispense Instructions for use    CIALIS 5 MG tablet   Generic drug:  tadalafil      Reported on 2/14/2017       ibuprofen 200 MG tablet    ADVIL/MOTRIN     Take 200 mg by mouth every 6 hours as needed for mild pain Reported on 2/14/2017       [place] [facility]      Respironics REMSTAR 60 Series Auto CPAP 8-10cm H2O, mirage Fx nasal cushion, std       senna-docusate 8.6-50 MG per tablet    SENOKOT-S;PERICOLACE    30 tablet    Take 1-2 tablets by mouth 2 times daily as needed for constipation Take while on oral narcotics to prevent or treat constipation.       triamcinolone  0.1 % cream    KENALOG     Apply topically as needed Reported on 2/14/2017       VITAMIN D PO      Take 1 tablet by mouth daily Take one tablet daily

## 2017-06-08 NOTE — Clinical Note
Please see the discharge summary report completed in PT today.  Thank you for referring Ajit to us! Cordially,  ROBERT Jameson, MPT

## 2017-07-18 ENCOUNTER — DOCUMENTATION ONLY (OUTPATIENT)
Dept: SLEEP MEDICINE | Facility: CLINIC | Age: 67
End: 2017-07-18

## 2017-07-18 DIAGNOSIS — G47.33 OSA (OBSTRUCTIVE SLEEP APNEA): Primary | Chronic | ICD-10-CM

## 2017-07-18 NOTE — PROGRESS NOTES
Patient came to [place] for mask fitting appointment on July 18, 2017. Patient requested to switch masks because he wants to try a full face mask.  Patient tried on the followings masks: Airfit F20   Patient selected  Resmed, type Airfit F2 0Full Face mask Medium.

## 2017-10-16 DIAGNOSIS — G47.33 OSA (OBSTRUCTIVE SLEEP APNEA): Primary | Chronic | ICD-10-CM

## 2017-10-16 PROBLEM — M54.50 CHRONIC LOW BACK PAIN: Status: ACTIVE | Noted: 2017-10-16

## 2017-10-16 PROBLEM — G89.29 CHRONIC LOW BACK PAIN: Status: ACTIVE | Noted: 2017-10-16

## 2017-10-16 NOTE — PROGRESS NOTES
LOV 9-8-16 Dr. Begum. Patient called requesting an order for a Travel size machine. [place]. Please put in order for machine,   Thanks.

## 2018-09-26 ENCOUNTER — OFFICE VISIT (OUTPATIENT)
Dept: SLEEP MEDICINE | Facility: CLINIC | Age: 68
End: 2018-09-26
Payer: COMMERCIAL

## 2018-09-26 VITALS
BODY MASS INDEX: 23.56 KG/M2 | WEIGHT: 138 LBS | HEIGHT: 64 IN | OXYGEN SATURATION: 99 % | DIASTOLIC BLOOD PRESSURE: 72 MMHG | SYSTOLIC BLOOD PRESSURE: 121 MMHG | HEART RATE: 58 BPM

## 2018-09-26 DIAGNOSIS — G47.33 OSA (OBSTRUCTIVE SLEEP APNEA): Primary | Chronic | ICD-10-CM

## 2018-09-26 PROBLEM — G89.29 CHRONIC LOW BACK PAIN: Chronic | Status: ACTIVE | Noted: 2017-10-16

## 2018-09-26 PROBLEM — M54.50 CHRONIC LOW BACK PAIN: Chronic | Status: ACTIVE | Noted: 2017-10-16

## 2018-09-26 PROCEDURE — 99214 OFFICE O/P EST MOD 30 MIN: CPT | Performed by: INTERNAL MEDICINE

## 2018-09-26 NOTE — MR AVS SNAPSHOT
After Visit Summary   9/26/2018    Doni Niño    MRN: [id number]           Patient Information     Date Of Birth          1950        Visit Information        Provider Department      9/26/2018 4:00 PM Davonte Begum MD [facility]        Today's Diagnoses     JULIO (obstructive sleep apnea)- moderate (AHI 28)    -  1      Care Instructions    Increased AHI on download to 17.8, with mostly obstructive events but also 4.9% PB  - Go back to using nasal pillows, recheck download in 30 days . If AI remains elevated would increased pressure to10-18 cmh20 and repeat download in 30 days  -Consider Polysomnogram with all-night titration if needed.        Your BMI is Body mass index is 23.69 kg/(m^2).  Weight management is a personal decision.  If you are interested in exploring weight loss strategies, the following discussion covers the approaches that may be successful. Body mass index (BMI) is one way to tell whether you are at a healthy weight, overweight, or obese. It measures your weight in relation to your height.  A BMI of 18.5 to 24.9 is in the healthy range. A person with a BMI of 25 to 29.9 is considered overweight, and someone with a BMI of 30 or greater is considered obese. More than two-thirds of American adults are considered overweight or obese.  Being overweight or obese increases the risk for further weight gain. Excess weight may lead to heart disease and diabetes.  Creating and following plans for healthy eating and physical activity may help you improve your health.  Weight control is part of healthy lifestyle and includes exercise, emotional health, and healthy eating habits. Careful eating habits lifelong are the mainstay of weight control. Though there are significant health benefits from weight loss, long-term weight loss with diet alone may be very difficult to achieve- studies show long-term success with dietary management in less than 10% of people. Attaining a  healthy weight may be especially difficult to achieve in those with severe obesity. In some cases, medications, devices and surgical management might be considered.  What can you do?  If you are overweight or obese and are interested in methods for weight loss, you should discuss this with your provider.     Consider reducing daily calorie intake by 500 calories.     Keep a food journal.     Avoiding skipping meals, consider cutting portions instead.    Diet combined with exercise helps maintain muscle while optimizing fat loss. Strength training is particularly important for building and maintaining muscle mass. Exercise helps reduce stress, increase energy, and improves fitness. Increasing exercise without diet control, however, may not burn enough calories to loose weight.       Start walking three days a week 10-20 minutes at a time    Work towards walking thirty minutes five days a week     Eventually, increase the speed of your walking for 1-2 minutes at time    In addition, we recommend that you review healthy lifestyles and methods for weight loss available through the National Institutes of Health patient information sites:  http://win.niddk.nih.gov/publications/index.htm    And look into health and wellness programs that may be available through your health insurance provider, employer, local community center, or [employer] club.                  Follow-ups after your visit        Follow-up notes from your care team     Return in about 2 years (around 9/26/2020), or if symptoms worsen or fail to improve, for Routine Visit.      Who to contact     If you have questions or need follow up information about today's clinic visit or your schedule please contact [facility] directly at [phone].  Normal or non-critical lab and imaging results will be communicated to you by MyChart, letter or phone within 4 business days after the clinic has received the results. If you do not hear from us within 7  "days, please contact the clinic through [employer] or phone. If you have a critical or abnormal lab result, we will notify you by phone as soon as possible.  Submit refill requests through [employer] or call your pharmacy and they will forward the refill request to us. Please allow 3 business days for your refill to be completed.          Additional Information About Your Visit        [employer] Information     [employer] lets you send messages to your doctor, view your test results, renew your prescriptions, schedule appointments and more. To sign up, go to [email or web address] . Click on \"Log in\" on the left side of the screen, which will take you to the Welcome page. Then click on \"Sign up Now\" on the right side of the page.     You will be asked to enter the access code listed below, as well as some personal information. Please follow the directions to create your username and password.     Your access code is: [email or web address]  Expires: 2018  4:36 PM     Your access code will  in 90 days. If you need help or a new code, please call your [facility] clinic or [phone].        Care EveryWhere ID     This is your Care EveryWhere ID. This could be used by other organizations to access your [facility] medical records  [id number]        Your Vitals Were     Pulse Height Pulse Oximetry BMI (Body Mass Index)          58 1.626 m (5' 4\") 99% 23.69 kg/m2         Blood Pressure from Last 3 Encounters:   18 121/72   17 113/64   17 113/69    Weight from Last 3 Encounters:   18 62.6 kg (138 lb)   17 63.5 kg (140 lb)   17 63.8 kg (140 lb 9.6 oz)              We Performed the Following     Comprehensive DME        Primary Care Provider Office Phone # Fax #    Darnell Persaud [phone]       [facility] [address] [facility] [address]        Equal Access to Services     HERBIE MATHIAS : Jenni Mckinney, wendy betancourt, qaybta carter clarke, mala grissom " cheli pervipin libiamemo goldsmith ah. So [facility] [phone].    ATENCIÓN: Si candyla mar, tiene a yanez disposición servicios gratuitos de asistencia lingüística. Inderjit al [phone].    We comply with applicable federal civil rights laws and [place] laws. We do not discriminate on the basis of race, color, national origin, age, disability, sex, sexual orientation, or gender identity.            Thank you!     Thank you for choosing [facility]  for your care. Our goal is always to provide you with excellent care. Hearing back from our patients is one way we can continue to improve our services. Please take a few minutes to complete the written survey that you may receive in the mail after your visit with us. Thank you!             Your Updated Medication List - Protect others around you: Learn how to safely use, store and throw away your medicines at www.disposemymeds.org.          This list is accurate as of 9/26/18  4:37 PM.  Always use your most recent med list.                   Brand Name Dispense Instructions for use Diagnosis    CIALIS 5 MG tablet   Generic drug:  tadalafil      Reported on 2/14/2017        ibuprofen 200 MG tablet    ADVIL/MOTRIN     Take 200 mg by mouth every 6 hours as needed for mild pain Reported on 2/14/2017        order for DME      Respironics REMSTAR 60 Series Auto CPAP 8-10 cm H2O        senna-docusate 8.6-50 MG per tablet    SENOKOT-S;PERICOLACE    30 tablet    Take 1-2 tablets by mouth 2 times daily as needed for constipation Take while on oral narcotics to prevent or treat constipation.    BPH (benign prostatic hypertrophy) with urinary obstruction       triamcinolone 0.1 % cream    KENALOG     Apply topically as needed Reported on 2/14/2017        VITAMIN D PO      Take 1 tablet by mouth daily Take one tablet daily

## 2018-09-26 NOTE — PATIENT INSTRUCTIONS
Increased AHI on download to 17.8, with mostly obstructive events but also 4.9% PB  - Go back to using nasal pillows, recheck download in 30 days . If AI remains elevated would increased pressure to10-18 cmh20 and repeat download in 30 days  -Consider Polysomnogram with all-night titration if needed.        Your BMI is Body mass index is 23.69 kg/(m^2).  Weight management is a personal decision.  If you are interested in exploring weight loss strategies, the following discussion covers the approaches that may be successful. Body mass index (BMI) is one way to tell whether you are at a healthy weight, overweight, or obese. It measures your weight in relation to your height.  A BMI of 18.5 to 24.9 is in the healthy range. A person with a BMI of 25 to 29.9 is considered overweight, and someone with a BMI of 30 or greater is considered obese. More than two-thirds of American adults are considered overweight or obese.  Being overweight or obese increases the risk for further weight gain. Excess weight may lead to heart disease and diabetes.  Creating and following plans for healthy eating and physical activity may help you improve your health.  Weight control is part of healthy lifestyle and includes exercise, emotional health, and healthy eating habits. Careful eating habits lifelong are the mainstay of weight control. Though there are significant health benefits from weight loss, long-term weight loss with diet alone may be very difficult to achieve- studies show long-term success with dietary management in less than 10% of people. Attaining a healthy weight may be especially difficult to achieve in those with severe obesity. In some cases, medications, devices and surgical management might be considered.  What can you do?  If you are overweight or obese and are interested in methods for weight loss, you should discuss this with your provider.     Consider reducing daily calorie intake by 500 calories.     Keep a food  journal.     Avoiding skipping meals, consider cutting portions instead.    Diet combined with exercise helps maintain muscle while optimizing fat loss. Strength training is particularly important for building and maintaining muscle mass. Exercise helps reduce stress, increase energy, and improves fitness. Increasing exercise without diet control, however, may not burn enough calories to loose weight.       Start walking three days a week 10-20 minutes at a time    Work towards walking thirty minutes five days a week     Eventually, increase the speed of your walking for 1-2 minutes at time    In addition, we recommend that you review healthy lifestyles and methods for weight loss available through the National Institutes of Health patient information sites:  http://win.niddk.nih.gov/publications/index.htm    And look into health and wellness programs that may be available through your health insurance provider, employer, local community center, or [employer] club.

## 2018-09-26 NOTE — NURSING NOTE
"Chief Complaint   Patient presents with     CPAP Follow Up       Initial /72  Pulse 58  Ht 1.626 m (5' 4\")  Wt 62.6 kg (138 lb)  SpO2 99%  BMI 23.69 kg/m2 Estimated body mass index is 23.69 kg/(m^2) as calculated from the following:    Height as of this encounter: 1.626 m (5' 4\").    Weight as of this encounter: 62.6 kg (138 lb).    Medication Reconciliation: complete        "

## 2018-09-26 NOTE — PROGRESS NOTES
"Obstructive Sleep Apnea - PAP Follow-Up Visit:    Chief Complaint   Patient presents with     CPAP Follow Up         Doni Niño comes in today for follow-up of their moderate sleep apnea, managed with CPAP.      The patient initially presented with feeling less refreshed from sleep and fatigue (ESS 6), snoring and witnessed apnea. A polysomnogram was recommended and completed on 3/20/2014. During the first portion of the study night the patient was found to have moderate JULIO (AHI 28.1, RDI 34.1, O2 madelyn 88%) and therefore a CPAP titration was completed during the second portion of the study night. CPAP was titrated to apressure of 6 cm/H20 with an AHI of 7.6. Supine REM was briefly seen at this pressure. Treatment was complicated by the emergence of central sleep apneas    Overall, he rates the experience with PAP as 8 (0 poor, 10 great). The mask is not comfortable.  The mask is uncomfortable because of \"sometimes tight and dry mouth\".  The mask is leaking at his face.  The mask is leaking 2 nights per week.  He is not snoring with the mask on. He is not having gasp arousals.  He is having significant oral/nasal dryness. The pressure is comfortable.     His PAP interface is Full Face Mask. He still has a dry mouth.     Bedtime is typically 2230. Usually it takes about 5 min minutes to fall asleep with the mask on. Wake time is typically 0730.  Patient is using PAP therapy 9 hours per night. The patient is usually getting 7 hours of sleep per night.    He does feel rested in the morning.    Total score - Parrott: 3 (9/26/2018  4:00 PM)    ARABELLA Total Score: 9      Respironics    Auto-PAP 8 - 10 cmH2O 30 day usage data:    76.7% of days with > 4 hours of use. 7/30 days with no use.   Average use 6 hours 45 minutes per day.   Average time of night in large leak 0 seconds.    CPAP 90% pressure 10.0cm.   AHI 17.8 events per hour.  PB 4.9%  SHANTAL 2.3  OAI 12.9          Past medical/surgical history, family history, " social history, medications and allergies were reviewed.      Current Outpatient Prescriptions   Medication Sig Dispense Refill     Cholecalciferol (VITAMIN D PO) Take 1 tablet by mouth daily Take one tablet daily       CIALIS 5 MG tablet Reported on 2/14/2017  12     ibuprofen (ADVIL/MOTRIN) 200 MG tablet Take 200 mg by mouth every 6 hours as needed for mild pain Reported on 2/14/2017       order for DME Respironics REMSTAR 60 Series Auto CPAP 8-10cm H2O, mirage Fx nasal cushion, std       senna-docusate (SENOKOT-S;PERICOLACE) 8.6-50 MG per tablet Take 1-2 tablets by mouth 2 times daily as needed for constipation Take while on oral narcotics to prevent or treat constipation. (Patient not taking: Reported on 9/26/2018) 30 tablet 0     triamcinolone (KENALOG) 0.1 % cream Apply topically as needed Reported on 2/14/2017       [DISCONTINUED] ORDER FOR DME Respironics REMSTAR 60 Series Auto CPAP 6-10cm H2O, [place] Fx nasal pillow mask w/medium pillows           Problem List:  Patient Active Problem List    Diagnosis Date Noted     JULIO (obstructive sleep apnea)- moderate (AHI 28) 03/25/2014     Priority: Medium     Study Date: 3/20/2014- (138.0 lbs) The lowest oxygen saturation was 88.0%. The combined Apnea/Hypopnea Index was 28.1 events per hour.  The REM AHI was 36.2.  The RERA index was 6.0 per hour.   The RDI was 34.1. On CPAP pressure 6.0,  AHI was 7.6. Supine REM was briefly seen at this pressure. Treatment was complicated by the emergence of central sleep apneas       Hyperlipidemia with target LDL less than 160      Priority: Medium     Chronic low back pain 10/16/2017     Priority: Low     lower back  gets cortisone injections       Hydrocele, bilateral      Priority: Low     Advanced directives, counseling/discussion 07/31/2012     Priority: Low     Discussed advance care planning with patient; information given to patient to review. 7/31/2012        BPH (benign prostatic hypertrophy)      Priority: Low      "Elevated prostate specific antigen (PSA) 01/10/2011     Priority: Low        /72  Pulse 58  Ht 1.626 m (5' 4\")  Wt 62.6 kg (138 lb)  SpO2 99%  BMI 23.69 kg/m2    Impression/Plan:     Moderate Sleep apnea.  Tolerating PAP well.  Increased AHI on download to 17.8, with mostly obstructive events but also 4.9% PB  - Go back to using nasal pillows, recheck download in 30 days . If AI remains elevated would increased pressure to10-18 cmh20 and repeat download in 30 days  -Consider Polysomnogram with all-night titration if needed.     Doni Niño will follow up in about 2 year(s) if doing well     25 minutes spent with patient, all of which were spent face-to-face counseling, consulting, coordinating plan of care.        "

## 2018-10-01 NOTE — PROGRESS NOTES
Called and lvm for patient to schedule and appt in 1 month to come in and get download of his cpap machine for Dr. Begum.

## 2018-10-02 ENCOUNTER — TELEPHONE (OUTPATIENT)
Dept: SLEEP MEDICINE | Facility: CLINIC | Age: 68
End: 2018-10-02

## 2018-10-02 DIAGNOSIS — G47.33 OSA (OBSTRUCTIVE SLEEP APNEA): Primary | Chronic | ICD-10-CM

## 2018-10-02 NOTE — TELEPHONE ENCOUNTER
The pt was returning a call that we placed per Dr. Begum. We will check pts modem and print a 30 day report to give to provider.     Pt states that per discussion at lov he wore a nasal mask for 3 nights after visit, and was not happy. He was more tired, and disturbing wife's sleep. Air was coming out of mouth as well. He went back to prior full face mask and stating he was sleeping better, and not as disruptive.     Jessica Osuna MA on 10/2/2018 at 2:58 PM

## 2018-11-13 ENCOUNTER — TELEPHONE (OUTPATIENT)
Dept: SLEEP MEDICINE | Facility: CLINIC | Age: 68
End: 2018-11-13

## 2018-11-13 DIAGNOSIS — G47.33 OSA (OBSTRUCTIVE SLEEP APNEA): Primary | ICD-10-CM

## 2018-11-13 NOTE — TELEPHONE ENCOUNTER
Pt came into clinic for download. When downloading it showed that the pressures were still at 8-10 cmH20. I did not know that the patient did not have a modem when changed remotely the first time. I did change the pressures in the office to the desired amount of 10-18 cm H20. Pt understood and would follow up in 30 days, and I explained I would forward this to the provider.     Jessica Osuna MA on 11/13/2018 at 1:39 PM

## 2018-11-13 NOTE — TELEPHONE ENCOUNTER
Called and spoke to pt. He needs to bring in his cpap as I was unable to get a download remotely. He stated he understood and would try to make it today 11/13/18.    Jessica Osuna MA on 11/13/2018 at 9:26 AM

## 2018-11-13 NOTE — TELEPHONE ENCOUNTER
Patient calling in after 30 days to see if  has reviewed his CPAP usage. Please advise. Previous notes in October state just a download needed to be done after 30 days so see how pressure increase worked out.

## 2018-11-16 NOTE — TELEPHONE ENCOUNTER
Patient walked into clinic today expressing his pressures are too high and air is blowing out around the mask. I spoke with JESSICA Steele and he is going to put an order to change pressures to 8-12 cm H@). I changed patient cpap pressures to 8-12 cm H2O.

## 2019-02-04 ENCOUNTER — OFFICE VISIT (OUTPATIENT)
Dept: SLEEP MEDICINE | Facility: CLINIC | Age: 69
End: 2019-02-04
Payer: MEDICARE

## 2019-02-04 VITALS
BODY MASS INDEX: 22.71 KG/M2 | OXYGEN SATURATION: 100 % | DIASTOLIC BLOOD PRESSURE: 67 MMHG | HEIGHT: 64 IN | WEIGHT: 133 LBS | SYSTOLIC BLOOD PRESSURE: 117 MMHG | HEART RATE: 50 BPM

## 2019-02-04 DIAGNOSIS — G47.33 OSA (OBSTRUCTIVE SLEEP APNEA): Primary | ICD-10-CM

## 2019-02-04 PROCEDURE — 99214 OFFICE O/P EST MOD 30 MIN: CPT | Performed by: INTERNAL MEDICINE

## 2019-02-04 ASSESSMENT — MIFFLIN-ST. JEOR: SCORE: 1284.28

## 2019-02-04 NOTE — PATIENT INSTRUCTIONS
Your BMI is Body mass index is 22.83 kg/m .  Weight management is a personal decision.  If you are interested in exploring weight loss strategies, the following discussion covers the approaches that may be successful. Body mass index (BMI) is one way to tell whether you are at a healthy weight, overweight, or obese. It measures your weight in relation to your height.  A BMI of 18.5 to 24.9 is in the healthy range. A person with a BMI of 25 to 29.9 is considered overweight, and someone with a BMI of 30 or greater is considered obese. More than two-thirds of American adults are considered overweight or obese.  Being overweight or obese increases the risk for further weight gain. Excess weight may lead to heart disease and diabetes.  Creating and following plans for healthy eating and physical activity may help you improve your health.  Weight control is part of healthy lifestyle and includes exercise, emotional health, and healthy eating habits. Careful eating habits lifelong are the mainstay of weight control. Though there are significant health benefits from weight loss, long-term weight loss with diet alone may be very difficult to achieve- studies show long-term success with dietary management in less than 10% of people. Attaining a healthy weight may be especially difficult to achieve in those with severe obesity. In some cases, medications, devices and surgical management might be considered.  What can you do?  If you are overweight or obese and are interested in methods for weight loss, you should discuss this with your provider.     Consider reducing daily calorie intake by 500 calories.     Keep a food journal.     Avoiding skipping meals, consider cutting portions instead.    Diet combined with exercise helps maintain muscle while optimizing fat loss. Strength training is particularly important for building and maintaining muscle mass. Exercise helps reduce stress, increase energy, and improves fitness.  Increasing exercise without diet control, however, may not burn enough calories to loose weight.       Start walking three days a week 10-20 minutes at a time    Work towards walking thirty minutes five days a week     Eventually, increase the speed of your walking for 1-2 minutes at time    In addition, we recommend that you review healthy lifestyles and methods for weight loss available through the National Institutes of Health patient information sites:  http://win.niddk.nih.gov/publications/index.htm    And look into health and wellness programs that may be available through your health insurance provider, employer, local community center, or [employer] club.    Weight management plan: Patient was referred to their PCP to discuss a diet and exercise plan.

## 2019-02-04 NOTE — NURSING NOTE
"Chief Complaint   Patient presents with     CPAP Follow Up       Initial /67   Pulse 50   Ht 1.626 m (5' 4\")   Wt 60.3 kg (133 lb)   SpO2 100%   BMI 22.83 kg/m   Estimated body mass index is 22.83 kg/m  as calculated from the following:    Height as of this encounter: 1.626 m (5' 4\").    Weight as of this encounter: 60.3 kg (133 lb).    Medication Reconciliation: complete      "

## 2019-02-04 NOTE — PROGRESS NOTES
Obstructive Sleep Apnea - PAP Follow-Up Visit:    Chief Complaint   Patient presents with     CPAP Follow Up         Doni Niño comes in today for follow-up of their moderate sleep apnea, managed with CPAP.      The patient initially presented with feeling less refreshed from sleep and fatigue (ESS 6), snoring and witnessed apnea. A polysomnogram was recommended and completed on 3/20/2014. During the first portion of the study night the patient was found to have moderate JULIO (AHI 28.1, RDI 34.1, O2 madelyn 88%) and therefore a CPAP titration was completed during the second portion of the study night. CPAP was titrated to apressure of 6 cm/H20 with an AHI of 7.6. Supine REM was briefly seen at this pressure. Treatment was complicated by the emergence of central sleep apneas    At 9/2018 vist on autopap 8-10 cnH20 the AHI on download was 17.8, with mostly obstructive events but also 4.9% PB. We increased pressure to 10-18 cmh20. Patient complained of higher pressures and were decreased to 8-12 cmH20.....    Overall, he rates the experience with PAP as 7 (0 poor, 10 great). The mask is comfortable.    The mask is not leaking .  He is not snoring with the mask on. He is not having gasp arousals.  He is having significant oral/nasal dryness. The pressure is comfortable.     His PAP interface is Full Face Mask.    Bedtime is typically 2230. Usually it takes about 5 min minutes to fall asleep with the mask on. Wake time is typically 0800.  Patient is using PAP therapy 6 hours per night. The patient is usually getting 7 hours of sleep per night.    He does feel rested in the morning.    Total score - [place]: 4 (2/4/2019 11:00 AM)  ARABELLA Total Score: 5    Respironics  Auto-PAP 8 - 12 cmH2O 30 day usage data:    60% of days with > 4 hours of use. 10/30 days with no use.   Average use 6 hours 13 minutes per day.   Average leak 11.9 LPM.     AHI 14.1 events per hour.  PB 5.7%   OA 10.4    He did not use PAP while travelling and  "takes a different travel machine.   He did not bring this.   He takes PAP off in early morning and stays in bed because its 'easier to sleep without it.           Past medical/surgical history, family history, social history, medications and allergies were reviewed.      Problem List:  Patient Active Problem List    Diagnosis Date Noted     Hydrocele, bilateral      Priority: Medium     JULIO (obstructive sleep apnea)- moderate (AHI 28) 03/25/2014     Priority: Medium     Study Date: 3/20/2014- (138.0 lbs) The lowest oxygen saturation was 88.0%. The combined Apnea/Hypopnea Index was 28.1 events per hour.  The REM AHI was 36.2.  The RERA index was 6.0 per hour.   The RDI was 34.1. On CPAP pressure 6.0,  AHI was 7.6. Supine REM was briefly seen at this pressure. Treatment was complicated by the emergence of central sleep apneas       Hyperlipidemia with target LDL less than 160      Priority: Medium     Advanced directives, counseling/discussion 07/31/2012     Priority: Medium     Discussed advance care planning with patient; information given to patient to review. 7/31/2012        BPH (benign prostatic hypertrophy)      Priority: Medium     Elevated prostate specific antigen (PSA) 01/10/2011     Priority: Medium     Chronic low back pain 10/16/2017     Priority: Low     lower back  gets cortisone injections          /67   Pulse 50   Ht 1.626 m (5' 4\")   Wt 60.3 kg (133 lb)   SpO2 100%   BMI 22.83 kg/m      Impression/Plan:     Moderate Sleep apnea. Tolerating PAP well.  Increased AHI on download, mostly OAs, some PB  -Increase pressure to 10-14 cmH20  -Recheck download in 30 days  - Bring travel machine in to adjust    Doni Niño will follow up in about 2 year(s).     Twenty-five minutes spent with patient, all of which were spent face-to-face counseling, consulting, coordinating plan of care.      "

## 2019-05-07 ENCOUNTER — TELEPHONE (OUTPATIENT)
Dept: SLEEP MEDICINE | Facility: CLINIC | Age: 69
End: 2019-05-07

## 2019-05-07 DIAGNOSIS — G47.33 OSA (OBSTRUCTIVE SLEEP APNEA): Primary | Chronic | ICD-10-CM

## 2019-05-07 NOTE — TELEPHONE ENCOUNTER
LOV 2/4/19 Patient called and wants 30 day results from his CPAP download. Patient would like a call back at [phone].    Thank you,     Tatianna Irizarry on 5/7/2019 at 9:16 AM

## 2019-05-08 NOTE — TELEPHONE ENCOUNTER
CPAP 10-14 download:  Average use 5 hours 18 minutes per day.  24 total days of use. 6 nonuse days.  63% days with >4 hours use.  90% pressure 14 cmH20.   Large Leak 0s. AHI 20.6.   PB 8.9%  OAI 17.6  SHANTAL 1.0  Leaks appear moderately high during events    Recommend we have a titration starting to see what is going on here  In meantime lets lower pressures again to 6-8 cmH20  Orders placed.

## 2019-05-08 NOTE — TELEPHONE ENCOUNTER
Patient called back and we scheduled titration study 5/16. Dr. Begum has no openings until 6/26 for results appt.

## 2019-05-16 ENCOUNTER — THERAPY VISIT (OUTPATIENT)
Dept: SLEEP MEDICINE | Facility: CLINIC | Age: 69
End: 2019-05-16
Payer: MEDICARE

## 2019-05-16 DIAGNOSIS — G47.33 OSA (OBSTRUCTIVE SLEEP APNEA): Chronic | ICD-10-CM

## 2019-05-16 PROCEDURE — 95811 POLYSOM 6/>YRS CPAP 4/> PARM: CPT | Performed by: INTERNAL MEDICINE

## 2019-05-16 NOTE — Clinical Note
Recommend setting CPAP at 10 cmH20. Orders PlacedConsider use of chinstrap with nasal maskWill discuss results at follow-up

## 2019-05-17 NOTE — PROGRESS NOTES
Completed a all night titration PSG per provider order.    A final therapeutic PAP pressure was achieved.    Supine REM was seen on therapeutic pressure.    Patient reports feeling refreshed in AM.

## 2019-05-21 PROBLEM — R00.1 BRADYCARDIA: Chronic | Status: ACTIVE | Noted: 2019-05-21

## 2019-05-21 NOTE — PROCEDURES
" SLEEP STUDY INTERPRETATION  TITRATION STUDY      Patient: HEIKE CHAUDHRY  YOB: 1950  Study Date: 5/16/2019  MRN: [id number]  Ordering Provider: Dr. Begum    Indications for Polysomnography: The patient is a 68 y old Male who is 5' 4\" and weighs 138.0 lbs. His BMI is 23.9, [employer] sleepiness scale 4.0 and neck circumference is 0.0 cm. A polysomnogram with PAP titration was performed to correct for history of moderate tolerating PAP well, increased AHI on download, mostly OAs, some periodic breathing    Polysomnogram Data: A full night polysomnogram recorded the standard physiologic parameters including EEG, EOG, EMG, ECG, nasal and oral airflow. Respiratory parameters of chest and abdominal movements were recorded with respiratory inductance plethysmography. Oxygen saturation was recorded by pulse oximetry. Hypopnea scoring rule used: 1B 4%.    Treatment PSG  Sleep Architecture:   The total recording time of the polysomnogram was 525.8 minutes. The total sleep time was 377.5 minutes. Sleep latency was decreased at 3.4 minutes without the use of a sleep aid. REM latency was 50.5 minutes. Arousal index was 15.1 arousals per hour. Sleep efficiency was decreased at 71.8%. Wake after sleep onset was 144.0 minutes. The patient spent 8.7% of total sleep time in Stage N1, 45.6% in Stage N2, 18.3% in Stage N3, and 27.4% in REM. Time in REM supine was 103.5 minutes.    Respiration:     The patient was titrated at pressures ranging from CPAP 5 cmH2O up to CPAP 13 cmH2O. The final pressure achieved was CPAP 10 cmH2O with a residual AHI of 0 events per hour. Time in REM supine on final pressure was 51.5 minutes.     This titration was considered optimal (residual AHI < 5 events per hour including REM supine sleep at final pressure).    Respiratory rate and pattern - was notable for frequent episodes of periodic breathing, however usually not well-consolidated    Sustained Sleep Associated Hypoventilation - " Transcutaneous carbon dioxide monitoring was not used, however significant hypoventilation was not suggested by oximetry    Sleep Associated Hypoxemia - (Greater than 5 minutes O2 sat at or below 88%) was not present. Baseline oxygen saturation was 96.9%. Lowest oxygen saturation was 82.3%. Time spent less than or equal to 88% was 0.9 minutes. Time spent less than or equal to 89% was 1.4 minutes.    Movement Activity:     Periodic Limb Activity - There were 24 PLMs during the entire study. The PLM index was 3.8 movements per hour. The PLM Arousal Index was 0.2 per hour.    REM EMG Activity - Excessive transient/sustained muscle activity was not present.    Nocturnal Behavior - Abnormal sleep related behaviors were not noted    Bruxism - None apparent.    Cardiac Summary:   The average pulse rate was 42.3 bpm. The minimum pulse rate was 32.9 bpm while the maximum pulse rate was 85.9 bpm. Arrhythmias were/were not noted.      Assessment:     JULIO appears to be well-managed with CPAP 10 cm while using a nasal mask with a chin-rest    Most of the breakthrough events appear to be periodic breatjing events without hypoxemia    Sinus bradycardia    Recommendations:    Treatment of JULIO with CPAP at 10 cmH2O. Consider use of nasal mask with chin strap.     Consider assessment of heart function with an echocardiogram    Pharmacologic therapy should be used for management of restless legs syndrome only if present and clinically indicated and not based on the presence of periodic limb movements alone.    Diagnostic Codes:   Obstructive Sleep Apnea G47.33      _____________________________________   Electronically Signed By: Davonte Begum MD 5/21/19         Range(%) Time in range (min)   0.0 - 89.0 1.4   0.0 - 88.0 0.9         Stage Min(mm Hg) Max(mm Hg)   Wake - -   NREM(1+2+3) - -   REM - -         Range(mmHg) Time in range (min)   - -   - -

## 2019-05-22 LAB — SLPCOMP: NORMAL

## 2019-05-24 NOTE — PROGRESS NOTES
Pt came in and pressures were changed.      He did bring his mask from home for the test. He states he has gotten a mask within the past year. Could that be the cause of the leak? He is open to either trying the nasal mask or full face. He will just need a order to switch to the nasal mask if that is the route you would like.      Jessica Osuna MA on 5/24/2019 at 1:18 PM

## 2019-05-24 NOTE — PROGRESS NOTES
Spoke with pt he will bring his machine in to change pressures.     He also wanted to note he using a full face mask. He has an old nasal mask with chin strap. Please advise if you want him to switch to nasal with chin strap over the full face he is using. He is open to either, but was only concerned about snoring with nasal. He did note that he did not us a chin strap before with nasal mask.     Jessica Osuna MA on 5/24/2019 at 9:23 AM

## 2019-05-28 NOTE — PROGRESS NOTES
Called and spoke to pt. He will follow up with [facility].     Jessica Osuna MA on 5/28/2019 at 1:28 PM

## 2019-06-05 ENCOUNTER — DOCUMENTATION ONLY (OUTPATIENT)
Dept: SLEEP MEDICINE | Facility: CLINIC | Age: 69
End: 2019-06-05
Payer: MEDICARE

## 2019-06-05 DIAGNOSIS — R00.1 BRADYCARDIA: ICD-10-CM

## 2019-06-05 DIAGNOSIS — G47.33 OSA (OBSTRUCTIVE SLEEP APNEA): ICD-10-CM

## 2019-06-05 NOTE — PROGRESS NOTES
Patient came to [place] for mask fitting appointment on June 5, 2019. Patient requested to switch masks because of wanting to switch from Full Face to Nasal cushion mask. Patient tried on the following Nasal cushion masks: Resmed Airfit N20, Resmed Airfit N30i, [employer] Eson 2. Patient selected a Resmed Mask name: Airfit N20 Nasal mask size Small.  Patient also received a Respironics Premium black chin strap and disposable filters today.

## 2019-06-26 ENCOUNTER — OFFICE VISIT (OUTPATIENT)
Dept: SLEEP MEDICINE | Facility: CLINIC | Age: 69
End: 2019-06-26
Payer: MEDICARE

## 2019-06-26 VITALS
SYSTOLIC BLOOD PRESSURE: 108 MMHG | OXYGEN SATURATION: 100 % | BODY MASS INDEX: 22.88 KG/M2 | HEIGHT: 64 IN | DIASTOLIC BLOOD PRESSURE: 69 MMHG | HEART RATE: 50 BPM | WEIGHT: 134 LBS

## 2019-06-26 DIAGNOSIS — G47.33 OSA (OBSTRUCTIVE SLEEP APNEA): Primary | ICD-10-CM

## 2019-06-26 DIAGNOSIS — R00.1 BRADYCARDIA: ICD-10-CM

## 2019-06-26 DIAGNOSIS — R06.3 PERIODIC BREATHING: ICD-10-CM

## 2019-06-26 PROCEDURE — 99214 OFFICE O/P EST MOD 30 MIN: CPT | Performed by: INTERNAL MEDICINE

## 2019-06-26 ASSESSMENT — MIFFLIN-ST. JEOR: SCORE: 1288.82

## 2019-06-26 NOTE — PATIENT INSTRUCTIONS
Your BMI is Body mass index is 23 kg/m .  Weight management is a personal decision.  If you are interested in exploring weight loss strategies, the following discussion covers the approaches that may be successful. Body mass index (BMI) is one way to tell whether you are at a healthy weight, overweight, or obese. It measures your weight in relation to your height.  A BMI of 18.5 to 24.9 is in the healthy range. A person with a BMI of 25 to 29.9 is considered overweight, and someone with a BMI of 30 or greater is considered obese. More than two-thirds of American adults are considered overweight or obese.  Being overweight or obese increases the risk for further weight gain. Excess weight may lead to heart disease and diabetes.  Creating and following plans for healthy eating and physical activity may help you improve your health.  Weight control is part of healthy lifestyle and includes exercise, emotional health, and healthy eating habits. Careful eating habits lifelong are the mainstay of weight control. Though there are significant health benefits from weight loss, long-term weight loss with diet alone may be very difficult to achieve- studies show long-term success with dietary management in less than 10% of people. Attaining a healthy weight may be especially difficult to achieve in those with severe obesity. In some cases, medications, devices and surgical management might be considered.  What can you do?  If you are overweight or obese and are interested in methods for weight loss, you should discuss this with your provider.     Consider reducing daily calorie intake by 500 calories.     Keep a food journal.     Avoiding skipping meals, consider cutting portions instead.    Diet combined with exercise helps maintain muscle while optimizing fat loss. Strength training is particularly important for building and maintaining muscle mass. Exercise helps reduce stress, increase energy, and improves fitness.  Increasing exercise without diet control, however, may not burn enough calories to loose weight.       Start walking three days a week 10-20 minutes at a time    Work towards walking thirty minutes five days a week     Eventually, increase the speed of your walking for 1-2 minutes at time    In addition, we recommend that you review healthy lifestyles and methods for weight loss available through the National Institutes of Health patient information sites:  http://win.niddk.nih.gov/publications/index.htm    And look into health and wellness programs that may be available through your health insurance provider, employer, local community center, or [employer] club.    Weight management plan: Patient was referred to their PCP to discuss a diet and exercise plan.

## 2019-06-26 NOTE — NURSING NOTE
"Chief Complaint   Patient presents with     Study Results       Initial /69   Pulse 50   Ht 1.626 m (5' 4\")   Wt 60.8 kg (134 lb)   SpO2 100%   BMI 23.00 kg/m   Estimated body mass index is 23 kg/m  as calculated from the following:    Height as of this encounter: 1.626 m (5' 4\").    Weight as of this encounter: 60.8 kg (134 lb).    Medication Reconciliation: complete      "

## 2019-06-26 NOTE — PROGRESS NOTES
Sleep Study Follow-Up Visit:    Date on this visit: 6/26/2019    Doni Niño comes in today for follow-up of his sleep study done at the [facility] [place].    Doni Niño comes in today for follow-up of their moderate sleep apnea, managed with CPAP.      The patient initially presented with feeling less refreshed from sleep and fatigue (ESS 6), snoring and witnessed apnea. A polysomnogram was recommended and completed on 3/20/2014. During the first portion of the study night the patient was found to have moderate JULIO (AHI 28.1, RDI 34.1, O2 madelyn 88%) and therefore a CPAP titration was completed during the second portion of the study night. CPAP was titrated to apressure of 6 cm/H20 with an AHI of 7.6. Supine REM was briefly seen at this pressure. Treatment was complicated by the emergence of central sleep apneas    At 9/2018 vist on autopap 8-10 cnH20 the AHI on download was 17.8, with mostly obstructive events but also 4.9% PB. We increased pressure to 10-18 cmh20. Patient complained of higher pressures and were decreased to 8-12 cmH20.    Due to persistently elevated AHI and evidence for periodic breathing on download we recommended a repeat titration study    Study Date: 5/16/2019 (138.0 lbs)  -The final pressure achieved was CPAP 10 cmH2O with a residual AHI of 0 events per hour. Time in REM supine on final pressure was 51.5 minutes.   -Respiratory rate and pattern was notable for frequent episodes of periodic breathing, however usually not well-consolidated  -Lowest oxygen saturation was 82.3%. Time spent less than or equal to 88% was 0.9 minutes. Time spent less than or equal to 89% was 1.4 minutes.    His pressure were set at 10 cm and he likes it better  These findings were reviewed with patient.     Past medical/surgical history, family history, social history, medications and allergies were reviewed.      Problem List:  Patient Active Problem List    Diagnosis Date Noted      "Bradycardia 05/21/2019     Priority: Medium              Hydrocele, bilateral      Priority: Medium     JULIO (obstructive sleep apnea)- moderate (AHI 28) 03/25/2014     Priority: Medium     Study Date: 3/20/2014- (138.0 lbs) The lowest oxygen saturation was 88.0%. The combined Apnea/Hypopnea Index was 28.1 events per hour.  The REM AHI was 36.2.  The RERA index was 6.0 per hour.   The RDI was 34.1. On CPAP pressure 6.0,  AHI was 7.6. Supine REM was briefly seen at this pressure. Treatment was complicated by the emergence of central sleep apneas  Study Date: 5/16/2019 (138.0 lbs) The final pressure achieved was CPAP 10 cmH2O with a residual AHI of 0 events per hour. Time in REM supine on final pressure was 51.5 minutes. Respiratory rate and pattern was notable for frequent episodes of periodic breathing, however usually not well-consolidated. Lowest oxygen saturation was 82.3%. Time spent less than or equal to 88% was 0.9 minutes. Time spent less than or equal to 89% was 1.4 minutes.       Hyperlipidemia with target LDL less than 160      Priority: Medium     Advanced directives, counseling/discussion 07/31/2012     Priority: Medium     Discussed advance care planning with patient; information given to patient to review. 7/31/2012        BPH (benign prostatic hypertrophy)      Priority: Medium     Elevated prostate specific antigen (PSA) 01/10/2011     Priority: Medium     Chronic low back pain 10/16/2017     Priority: Low     lower back  gets cortisone injections        /69   Pulse 50   Ht 1.626 m (5' 4\")   Wt 60.8 kg (134 lb)   SpO2 100%   BMI 23.00 kg/m        Impression/Plan:    Obstructive sleep apnea complicated by Periodic breathing  -Treatment of JULIO with CPAP at 10 cmH2O.   -Consider use of nasal mask with chin strap.   -Echocardiogram   - He does not appear to have symptoms or hypoxemia from the periodic breathing at this time. He does not have any signs or symptoms of congestive heart failure. He " has had lifelong gradycardia.     He will follow up with me in about 2 year(s).     Twenty-five minutes spent with patient, all of which were spent face-to-face counseling, consulting, coordinating plan of care.      Davonte Begum

## 2019-07-02 ENCOUNTER — ANCILLARY PROCEDURE (OUTPATIENT)
Dept: CARDIOLOGY | Facility: CLINIC | Age: 69
End: 2019-07-02
Attending: INTERNAL MEDICINE
Payer: MEDICARE

## 2019-07-02 DIAGNOSIS — G47.33 OSA (OBSTRUCTIVE SLEEP APNEA): ICD-10-CM

## 2019-07-02 DIAGNOSIS — R06.3 PERIODIC BREATHING: ICD-10-CM

## 2019-07-02 DIAGNOSIS — R00.1 BRADYCARDIA: ICD-10-CM

## 2019-07-02 PROCEDURE — 93306 TTE W/DOPPLER COMPLETE: CPT | Performed by: INTERNAL MEDICINE

## 2020-03-06 DIAGNOSIS — G47.33 OBSTRUCTIVE SLEEP APNEA (ADULT) (PEDIATRIC): Primary | ICD-10-CM

## 2020-03-06 DIAGNOSIS — G47.33 OSA (OBSTRUCTIVE SLEEP APNEA): ICD-10-CM

## 2020-03-06 DIAGNOSIS — R00.1 BRADYCARDIA: ICD-10-CM

## 2021-02-08 ENCOUNTER — TELEPHONE (OUTPATIENT)
Dept: SLEEP MEDICINE | Facility: CLINIC | Age: 71
End: 2021-02-08

## 2021-02-10 ENCOUNTER — TELEPHONE (OUTPATIENT)
Dept: HOME HEALTH SERVICES | Facility: CLINIC | Age: 71
End: 2021-02-10

## 2021-02-10 DIAGNOSIS — R00.1 BRADYCARDIA: ICD-10-CM

## 2021-02-10 DIAGNOSIS — G47.33 OSA (OBSTRUCTIVE SLEEP APNEA): ICD-10-CM
